# Patient Record
Sex: FEMALE | Race: BLACK OR AFRICAN AMERICAN | Employment: UNEMPLOYED | URBAN - NONMETROPOLITAN AREA
[De-identification: names, ages, dates, MRNs, and addresses within clinical notes are randomized per-mention and may not be internally consistent; named-entity substitution may affect disease eponyms.]

---

## 2017-08-25 ENCOUNTER — TELEPHONE (OUTPATIENT)
Dept: CARDIOLOGY | Age: 47
End: 2017-08-25

## 2017-10-11 ENCOUNTER — OFFICE VISIT (OUTPATIENT)
Dept: CARDIOLOGY | Age: 47
End: 2017-10-11
Payer: COMMERCIAL

## 2017-10-11 VITALS
DIASTOLIC BLOOD PRESSURE: 86 MMHG | HEART RATE: 70 BPM | SYSTOLIC BLOOD PRESSURE: 122 MMHG | BODY MASS INDEX: 25.33 KG/M2 | HEIGHT: 65 IN | WEIGHT: 152 LBS

## 2017-10-11 DIAGNOSIS — R07.89 OTHER CHEST PAIN: Primary | ICD-10-CM

## 2017-10-11 DIAGNOSIS — F17.200 SMOKER: ICD-10-CM

## 2017-10-11 DIAGNOSIS — I10 ESSENTIAL HYPERTENSION: ICD-10-CM

## 2017-10-11 DIAGNOSIS — Z82.49 FAMILY HISTORY OF CORONARY ARTERY DISEASE: ICD-10-CM

## 2017-10-11 DIAGNOSIS — R00.2 INTERMITTENT PALPITATIONS: ICD-10-CM

## 2017-10-11 PROBLEM — R07.9 CHEST PAIN: Status: ACTIVE | Noted: 2017-10-11

## 2017-10-11 PROCEDURE — 99204 OFFICE O/P NEW MOD 45 MIN: CPT | Performed by: NURSE PRACTITIONER

## 2017-10-11 RX ORDER — METOPROLOL SUCCINATE 25 MG/1
25 TABLET, EXTENDED RELEASE ORAL 2 TIMES DAILY
Qty: 60 TABLET | Refills: 5 | Status: SHIPPED | OUTPATIENT
Start: 2017-10-11 | End: 2017-10-11 | Stop reason: SDUPTHER

## 2017-10-11 RX ORDER — CLONAZEPAM 1 MG/1
1 TABLET ORAL 2 TIMES DAILY PRN
COMMUNITY

## 2017-10-11 RX ORDER — CYCLOBENZAPRINE HCL 5 MG
5 TABLET ORAL 3 TIMES DAILY PRN
COMMUNITY

## 2017-10-11 RX ORDER — DULOXETIN HYDROCHLORIDE 60 MG/1
60 CAPSULE, DELAYED RELEASE ORAL DAILY
COMMUNITY
End: 2018-07-30 | Stop reason: ALTCHOICE

## 2017-10-11 RX ORDER — SPIRONOLACTONE AND HYDROCHLOROTHIAZIDE 25; 25 MG/1; MG/1
1 TABLET ORAL DAILY
COMMUNITY

## 2017-10-11 RX ORDER — METOPROLOL SUCCINATE 25 MG/1
25 TABLET, EXTENDED RELEASE ORAL 2 TIMES DAILY
Qty: 60 TABLET | Refills: 5 | Status: SHIPPED | OUTPATIENT
Start: 2017-10-11

## 2017-10-11 NOTE — PROGRESS NOTES
sustained arrythmia, edema and fatigue. The patient denies numbness or weakness to suggest cerebrovascular accident or transient ischemic attack.  + random sharp right chest pain without radiation or associated symptoms. + palpitations reported as stable. + fatigue.     Meron Laurent has the following history as recorded in [facility]:    Patient Active Problem List   Diagnosis Code    HTN (hypertension) I10    Intermittent palpitations R00.2    SOB (shortness of breath) R06.02    TIA (transient ischemic attack) G45.9    Syncope, near [address] [place] history of coronary artery disease Z82.49    Smoker F17.200    Chest pain R07.9     Past Medical History:   Diagnosis Date    HTN (hypertension)     Palpitations     SOB (shortness of breath)     Syncope, near     TIA (transient ischemic attack)      Past Surgical History:   Procedure Laterality Date    HYSTERECTOMY  2004     Family History   Problem Relation Age of Onset    Other Father      leukemia    Cancer Father     High Cholesterol Mother     Hypertension Mother     High Blood Pressure Mother     Heart Failure Maternal Aunt     Diabetes Maternal Grandmother     Stroke Maternal Grandmother     Cancer Maternal Grandfather     Other Paternal Grandmother      anuerysm    Stroke Paternal Magalys Bruch     Stroke Sister      Social History   Substance Use Topics    Smoking status: Current Every Day Smoker     Types: Cigarettes    Smokeless tobacco: Never Used    Alcohol use Yes      Comment: ocassionally      Current Outpatient Prescriptions   Medication Sig Dispense Refill    spironolactone-hydrochlorothiazide (ALDACTAZIDE) 25-25 MG per tablet Take 1 tablet by mouth daily      DULoxetine (CYMBALTA) 60 MG extended release capsule Take 60 mg by mouth daily      cyclobenzaprine (FLEXERIL) 5 MG tablet Take 5 mg by mouth 3 times daily as needed for Muscle spasms      clonazePAM (KLONOPIN) 1 MG tablet Take 1 mg by mouth 2 times daily as kg/m²   General - Amiel Klinefelter is alert, cooperative, and pleasant. Well groomed. No acute distress. Body habitus - Body mass index is 25.29 kg/m². HEENT  Head is normocephalic. No circumoral cyanosis. Dentition is normal.  EYES -   Lids normal without ptosis. No discharge, edema or subconjunctival hemorrhage. Neck - Symmetrical without apparent mass or lymphadenopathy. Respiratory - Normal respiratory effort without use of accessory muscles. Ausculatation reveals vesicular breath sounds without crackles, wheezes, rub or rhonchi. Cardiovascular  No jugular venous distention. Auscultation reveals regular rate and rhythm. No audible clicks, gallop or rub. No murmur. No lower extremity varicosities. No carotid bruits. Abdominal -  No visible distention, mass or pulsations. Extremities - No clubbing or cyanosis. No statis dermatitis or ulcers. No edema. Musculoskeletal -   No Osler's nodes. No kyphosis or scoliosis. Gait is even and regular without limp or shuffle. Ambulates without assistance. Skin -  Warm and dry; no rash or pallor. No new surgical wound. Neurological - No focal neurological deficits. Thought processes coherent. No apparent tremor. Oriented to person, place and time. Psychiatric -  Appropriate affect and mood. Assessment:    1. Other chest pain  NM Myocardial Spect Rest Multi   2. Essential hypertension  NM Myocardial Spect Rest Multi   3. Smoker  NM Myocardial Spect Rest Multi   4. Family history of coronary artery disease  NM Myocardial Spect Rest Multi   5. Intermittent palpitations  NM Myocardial Spect Rest Multi     EKG from 4/14/17 reviewed:  NSR 77 bpm; no acute ischemic changes; non specific T wave abnormality; no ectopy. Essentially unchanged from previous EKG scanned in [facility]. Stable CV status without symptoms of overt heart failure. Palpitations reported as stable on Toprol XL 25 mg one daily. Patient does have risk factors for CAD.   She reason you are unable to keep this appointment, please contact Outpatient Scheduling, [phone], as soon as possible to reschedule. What is a Emry Dural? Zuleta Craft may be ordered by for those unable to exercise enough to increase blood flow to their heart during an exercise stress test.  Emry Dural is used to help produce images of the heart for diagnosing blocked heart arteries. This test is not invasive. You will be awake during the entire test.    Your heart rhythm, blood pressure and oxygen level will be monitored during the test.  A small catheter will be placed in an arm vein. Emry Dural is injected through the catheter into your bloodstream for 10 seconds followed immediately by saline solution to clear the line. Emry Dural dilates the heart arteries to allow increased blood flow to the heart. 10-20 seconds after the saline solution, a small dose of radioactive isotope imaging tracer is injected into the catheter. After the tracer is absorbed in your system and distributed to the heart arteries, a special camera will take detailed pictures of your heart. The pictures will show how well the blood flows into your heart and if there are any areas of blockage. While you lie on your back with your arms above your head, the camera will take pictures for about 20-40 minutes.     One set of images will be taken when the Emry Dural is active in your system, and a second set of images will be taken when you're considered at rest.       MAYTE Huerta

## 2017-10-11 NOTE — PATIENT INSTRUCTIONS
Start taking Toprol XL (metoprolol) 25 mg (1) tab twice daily to help control the heart skipping. Continue current medications as prescribed. Continue to follow up with primary care provider for non cardiac medical problems. Call the office with any problems, questions or concerns at [phone]. Follow up as scheduled with your cardiologist - one month Dr. Edson Cowden. The following educational material has been included in this after visit summary for your review: palpitations.     Additional instructions:  Coronary artery disease risk factors you can control: Smoking, high blood pressure, high cholesterol, diabetes, being overweight, lack of exercise and stress. Continue heart healthy diet. Take medications as directed. Exercise as tolerated. Strive for 15 minutes of exercise most days of the week. If asked to keep a blood pressure log, do so for 2 weeks. Call the office to report readings at [phone]. Blood pressure goal is 140/90 or less. If you are a diabetic, the goal is 130/80 or less. If you are taking cholesterol lowering medications, it is recommended that lab work be checked annually. Always keep a current medication list. Bring your medications to every office visit.      Lexiscan Nuclear Stress Test   Date/Time: You are scheduled for your test on 11/7/17 @ 10:00 am please arrive @ 9:30 am to register at the [facility] [place] at the [facility] and [facility] located on the first [place] [address] through hospital main entrance and turn immediately to your left. Test Description:  There are two parts to a Lexiscan stress test: the rest portion and the exercise portion. For the rest portion, a radioactive tracer is injected into your arm through the IV. After 30 to 60 minutes, the process of imaging will begin. A nuclear camera will be placed on your chest area and images are taken for the next 15 to 20 minutes.   For the exercise portion, a small catheter will be placed in an arm vein. Ethel Job is injected through the catheter into your bloodstream for 10 seconds followed immediately by saline solution to clear the line. Ethel Job dilates the heart arteries to allow increased blood flow to the heart. 10-20 seconds after the saline solution, a small dose of radioactive isotope imaging tracer is injected into the catheter. After the tracer is absorbed in your system and distributed to the heart arteries, a special camera will take detailed pictures of your heart. The pictures will show how well the blood flows into your heart and if there are any areas of blockage. While you lie on your back with your arms above your head, the camera will take pictures for about 20-40 minutes. One set of images will be taken when the Ethel Job is active in your system, and a second set of images will be taken when you're considered at rest.        Patient Education        Palpitations: Care Instructions  Your Care Instructions    Heart palpitations are the uncomfortable sensation that your heart is beating fast or irregularly. You might feel pounding or fluttering in your chest. It might feel like your heart is skipping a beat. Although palpitations may be caused by a heart problem, they also occur because of stress, fatigue, or use of alcohol, caffeine, or nicotine. Many medicines, including diet pills, antihistamines, decongestants, and some herbal products, can cause heart palpitations. Nearly everyone has palpitations from time to time. Depending on your symptoms, your doctor may need to do more tests to try to find the cause of your palpitations. Follow-up care is a key part of your treatment and safety. Be sure to make and go to all appointments, and call your doctor if you are having problems. It's also a good idea to know your test results and keep a list of the medicines you take. How can you care for yourself at home?   · Avoid caffeine, nicotine, and excess alcohol. · Do not take illegal drugs, such as methamphetamines and cocaine. · Do not take weight loss or diet medicines unless you talk with your doctor first.  · Get plenty of sleep. · Do not overeat. · If you have palpitations again, take deep breaths and try to relax. This may slow a racing heart. · If you start to feel lightheaded, lie down to avoid injuries that might result if you pass out and fall down. · Keep a record of your palpitations and bring it to your next doctor's appointment. Write down:  ¨ The date and time. ¨ Your pulse. (If your heart is beating fast, it may be hard to count your pulse.)  ¨ What you were doing when the palpitations started. ¨ How long the palpitations lasted. ¨ Any other symptoms. · If an activity causes palpitations, slow down or stop. Talk to your doctor before you do that activity again. · Take your medicines exactly as prescribed. Call your doctor if you think you are having a problem with your medicine. When should you call for help? Call 911 anytime you think you may need emergency care. For example, call if:  · You passed out (lost consciousness). · You have symptoms of a heart attack. These may include:  ¨ Chest pain or pressure, or a strange feeling in the chest.  ¨ Sweating. ¨ Shortness of breath. ¨ Pain, pressure, or a strange feeling in the back, neck, jaw, or upper belly or in one or both shoulders or arms. ¨ Lightheadedness or sudden weakness. ¨ A fast or irregular heartbeat. After you call 911, the  may tell you to chew 1 adult-strength or 2 to 4 low-dose aspirin. Wait for an ambulance. Do not try to drive yourself. · You have symptoms of a stroke. These may include:  ¨ Sudden numbness, tingling, weakness, or loss of movement in your face, arm, or leg, especially on only one side of your body. ¨ Sudden vision changes. ¨ Sudden trouble speaking. ¨ Sudden confusion or trouble understanding simple statements.   ¨ Sudden problems with walking or balance. ¨ A sudden, severe headache that is different from past headaches. Call your doctor now or seek immediate medical care if:  · You have heart palpitations and:  ¨ Are dizzy or lightheaded, or you feel like you may faint. ¨ Have new or increased shortness of breath. Watch closely for changes in your health, and be sure to contact your doctor if:  · You continue to have heart palpitations. Where can you learn more? Go to https://AIS.OneShift. org and sign in to your [employer] account. Enter R508 in the [employer] box to learn more about \"Palpitations: Care Instructions. \"     If you do not have an account, please click on the \"Sign Up Now\" link. Current as of: April 3, 2017  Content Version: 11.3  © 4608-9504 [employer]. Care instructions adapted under license by [facility] ([facility]). If you have questions about a medical condition or this instruction, always ask your healthcare professional. [place] [address] any warranty or liability for your use of this information.

## 2018-07-30 ENCOUNTER — OFFICE VISIT (OUTPATIENT)
Dept: CARDIOLOGY | Age: 48
End: 2018-07-30
Payer: COMMERCIAL

## 2018-07-30 VITALS
SYSTOLIC BLOOD PRESSURE: 122 MMHG | BODY MASS INDEX: 26.16 KG/M2 | DIASTOLIC BLOOD PRESSURE: 78 MMHG | WEIGHT: 157 LBS | HEIGHT: 65 IN | HEART RATE: 77 BPM

## 2018-07-30 DIAGNOSIS — Z82.49 FAMILY HISTORY OF CORONARY ARTERY DISEASE: Primary | ICD-10-CM

## 2018-07-30 DIAGNOSIS — I10 ESSENTIAL HYPERTENSION: ICD-10-CM

## 2018-07-30 DIAGNOSIS — R07.9 CHEST PAIN, UNSPECIFIED TYPE: ICD-10-CM

## 2018-07-30 DIAGNOSIS — R00.2 INTERMITTENT PALPITATIONS: ICD-10-CM

## 2018-07-30 DIAGNOSIS — R53.83 FATIGUE, UNSPECIFIED TYPE: ICD-10-CM

## 2018-07-30 DIAGNOSIS — R07.89 OTHER CHEST PAIN: ICD-10-CM

## 2018-07-30 DIAGNOSIS — R00.0 RACING HEART BEAT: Primary | ICD-10-CM

## 2018-07-30 DIAGNOSIS — F17.200 SMOKER: ICD-10-CM

## 2018-07-30 DIAGNOSIS — R06.02 SOB (SHORTNESS OF BREATH): ICD-10-CM

## 2018-07-30 DIAGNOSIS — R00.0 RACING HEART BEAT: ICD-10-CM

## 2018-07-30 PROCEDURE — 0296T PR EXT ECG > 48HR TO 21 DAY RCRD W/CONECT INTL RCRD: CPT | Performed by: NURSE PRACTITIONER

## 2018-07-30 PROCEDURE — 93000 ELECTROCARDIOGRAM COMPLETE: CPT | Performed by: NURSE PRACTITIONER

## 2018-07-30 PROCEDURE — 99212 OFFICE O/P EST SF 10 MIN: CPT | Performed by: NURSE PRACTITIONER

## 2018-07-30 RX ORDER — ATORVASTATIN CALCIUM 10 MG/1
10 TABLET, FILM COATED ORAL DAILY
COMMUNITY

## 2018-07-30 RX ORDER — GABAPENTIN 300 MG/1
300 CAPSULE ORAL 3 TIMES DAILY
COMMUNITY

## 2018-07-30 NOTE — PROGRESS NOTES
[facility], [place]. [address], [place] [address] [facility]  [phone] office  [phone] fax      OFFICE VISIT:  2018    Carina Perkins - : 1970    Reason For Visit:  Sara Nicolas is a 46 y.o. female who is here for 6 Month Follow-Up (Patient presents for cardiology follow up reports some recent racing of heart and fatigue.); Tachycardia; and Hypertension    The patient presents today for cardiology follow up. The patient reports some recent heart racing duration 1-2 minutes. She also reports some fatigue and stable FLORES. She has not experienced chest pain. BP is well controlled on current regimen. The patient's PCP monitors cholesterol. Patient denies excessive caffeine intake, decongestants or diet aides. Reports recent labs normal.    Parisa Moralez denies exertional chest pain, orthopnea, paroxysmal nocturnal dyspnea, syncope, presyncope, sustained arrhythmia and edema. The patient denies numbness or weakness to suggest cerebrovascular accident or transient ischemic attack. + heart racing. + stable [facility].     Carina Perkins [facility] the [facility]:    Patient Active Problem List   Diagnosis Code    HTN (hypertension) I10    Intermittent palpitations R00.2    SOB (shortness of breath) R06.02    TIA (transient ischemic attack) G45.9    Syncope, near [address] [facility] history of coronary artery disease Z82.49    Smoker F17.200    Chest pain R07.9    Racing heart beat R00.0    Fatigue R53.83     Past Medical History:   Diagnosis Date    HTN (hypertension)     Palpitations     SOB (shortness of breath)     Syncope, near     TIA (transient ischemic attack)      Past Surgical History:   Procedure Laterality Date    HYSTERECTOMY       Family History   Problem Relation Age of Onset    Other Father         leukemia    Cancer Father     High Cholesterol Mother     Hypertension Mother     High Blood Pressure Mother     Heart Failure Maternal Aunt     Diabetes Maternal Grandmother     Stroke Maternal Grandmother     Cancer Maternal Grandfather     Other Paternal Grandmother         anuerysm    Stroke Paternal [de-identified]     Stroke Sister      Social History   Substance Use Topics    Smoking status: Current Every Day Smoker     Types: Cigarettes    Smokeless tobacco: Never Used    Alcohol use Yes      Comment: ocassionally      Current Outpatient Prescriptions   Medication Sig Dispense Refill    gabapentin (NEURONTIN) 300 MG capsule Take 300 mg by mouth 3 times daily. [facility] atorvastatin (LIPITOR) 10 MG tablet Take 10 mg by mouth daily      spironolactone-hydrochlorothiazide (ALDACTAZIDE) 25-25 MG per tablet Take 1 tablet by mouth daily      cyclobenzaprine (FLEXERIL) 5 MG tablet Take 5 mg by mouth 3 times daily as needed for Muscle spasms      clonazePAM (KLONOPIN) 1 MG tablet Take 1 mg by mouth 2 times daily as needed      metoprolol succinate (TOPROL XL) 25 MG extended release tablet Take 1 tablet by mouth 2 times daily 60 tablet 5    Multiple Vitamin (MULTI VITAMIN DAILY) TABS Take 1 tablet by mouth daily.  aspirin 81 MG EC tablet Take 81 mg by mouth daily. No current facility-administered medications for this visit. Allergies: Oxycodone    Review of Systems  Constitutional  no appetite change, or unexpected weight change. No fever, chills or diaphoresis. + fatigue - not new onset. HEENT  no significant rhinorrhea or epistaxis. No tinnitus or significant hearing loss. Eyes  no sudden vision change or amaurosis. No corneal arcus, xantholasma, subconjunctival hemorrhage or discharge. Respiratory  no significant wheezing, stridor, apnea or cough. + Stable FLORES. Cardiovascular  no exertional chest pain to suggest myocardial ischemia. No orthopnea or PND. No sensation of sustained arrythmia. No occurrence of slow heart rate.   No week.   If asked to keep a blood pressure log, do so for 2 weeks. Call the office to report readings at [phone]. Blood pressure goal is 140/90 or less. If you are a diabetic, the goal is 130/80 or less. If you are taking cholesterol lowering medications, it is recommended that lab work be checked annually. Always keep a current medication list. Bring your medications to every office visit.       MAYTE Verduzco

## 2018-07-30 NOTE — PATIENT INSTRUCTIONS
Continue current medications as prescribed. Continue to follow up with primary care provider for non cardiac medical problems. Call the office with any problems, questions or concerns at [phone]. Follow up as scheduled with your cardiologist - one month. The following educational material has been included in this after visit summary for your review: heart palpitations.     Additional instructions: The cardiac monitor will stay in place for 2 weeks. Use the symptom marker as instructed. Coronary artery disease risk factors you can control: Smoking, high blood pressure, high cholesterol, diabetes, being overweight, lack of exercise and stress. Continue heart healthy diet. Take medications as directed. Exercise as tolerated. Strive for 15 minutes of exercise most days of the week. If asked to keep a blood pressure log, do so for 2 weeks. Call the office to report readings at [phone]. Blood pressure goal is 140/90 or less. If you are a diabetic, the goal is 130/80 or less. If you are taking cholesterol lowering medications, it is recommended that lab work be checked annually. Always keep a current medication list. Bring your medications to every office visit.        Patient Education        Premature Heartbeat: Care Instructions  Your Care Instructions    A premature heartbeat happens when the heart beats earlier than it should. This briefly interrupts the heart's rhythm. You do not usually feel the early heartbeat, and the next beat is stronger. To many people, this feels like a skipped heartbeat or a flutter. If you have no heart problems, premature heartbeats are not a cause for concern. Most people have them at some time. They may happen more often if you drink a lot of caffeine or alcohol or are under stress. Usually, no cause for a premature heartbeat is found, and no treatment is needed. Follow-up care is a key part of your treatment and safety.  Be sure to make and go to all

## 2018-08-16 ENCOUNTER — TELEPHONE (OUTPATIENT)
Dept: CARDIOLOGY | Age: 48
End: 2018-08-16

## 2018-08-16 DIAGNOSIS — R00.0 RACING HEART BEAT: Primary | ICD-10-CM

## 2020-11-02 ENCOUNTER — TRANSCRIBE ORDERS (OUTPATIENT)
Dept: ADMINISTRATIVE | Facility: HOSPITAL | Age: 50
End: 2020-11-02

## 2020-11-02 DIAGNOSIS — Z01.818 PREOP TESTING: Primary | ICD-10-CM

## 2025-05-19 ENCOUNTER — TELEPHONE (OUTPATIENT)
Dept: NEUROSURGERY | Age: 55
End: 2025-05-19

## 2025-05-19 ENCOUNTER — TRANSCRIBE ORDERS (OUTPATIENT)
Dept: ADMINISTRATIVE | Facility: HOSPITAL | Age: 55
End: 2025-05-19
Payer: COMMERCIAL

## 2025-05-19 DIAGNOSIS — R07.9 CHEST PAIN, UNSPECIFIED TYPE: Primary | ICD-10-CM

## 2025-06-04 ENCOUNTER — HOSPITAL ENCOUNTER (OUTPATIENT)
Dept: CARDIOLOGY | Facility: HOSPITAL | Age: 55
Discharge: HOME | End: 2025-06-04
Payer: COMMERCIAL

## 2025-06-04 DIAGNOSIS — R07.9 CHEST PAIN, UNSPECIFIED TYPE: ICD-10-CM

## 2025-06-04 PROCEDURE — 34310000005 TECHNETIUM OXIDRONATE KIT

## 2025-06-04 PROCEDURE — A9561 TC99M OXIDRONATE: HCPCS

## 2025-06-04 PROCEDURE — 78803 RP LOCLZJ TUM SPECT 1 AREA: CPT

## 2025-06-04 RX ADMIN — TECHNETIUM TC 99M OXIDRONATE 1 DOSE: 3.15 INJECTION, POWDER, LYOPHILIZED, FOR SOLUTION INTRAVENOUS at 10:15

## 2025-07-07 ENCOUNTER — TELEPHONE (OUTPATIENT)
Dept: NEUROLOGY | Age: 55
End: 2025-07-07

## 2025-07-08 ENCOUNTER — OFFICE VISIT (OUTPATIENT)
Dept: NEUROLOGY | Age: 55
End: 2025-07-08
Payer: COMMERCIAL

## 2025-07-08 VITALS
DIASTOLIC BLOOD PRESSURE: 80 MMHG | OXYGEN SATURATION: 99 % | WEIGHT: 157 LBS | HEART RATE: 78 BPM | BODY MASS INDEX: 26.16 KG/M2 | SYSTOLIC BLOOD PRESSURE: 129 MMHG | HEIGHT: 65 IN

## 2025-07-08 DIAGNOSIS — Z82.49 FAMILY HISTORY OF ANEURYSM: ICD-10-CM

## 2025-07-08 DIAGNOSIS — R42 DIZZINESS: Primary | ICD-10-CM

## 2025-07-08 PROCEDURE — 99204 OFFICE O/P NEW MOD 45 MIN: CPT | Performed by: NURSE PRACTITIONER

## 2025-07-08 PROCEDURE — 3079F DIAST BP 80-89 MM HG: CPT | Performed by: NURSE PRACTITIONER

## 2025-07-08 PROCEDURE — 3074F SYST BP LT 130 MM HG: CPT | Performed by: NURSE PRACTITIONER

## 2025-07-08 RX ORDER — MECLIZINE HYDROCHLORIDE 25 MG/1
25 TABLET ORAL 3 TIMES DAILY PRN
Qty: 90 TABLET | Refills: 3 | Status: SHIPPED | OUTPATIENT
Start: 2025-07-08 | End: 2025-11-05

## 2025-07-08 RX ORDER — BUPROPION HYDROCHLORIDE 300 MG/1
300 TABLET ORAL EVERY MORNING
COMMUNITY

## 2025-07-08 RX ORDER — HYDROXYZINE HYDROCHLORIDE 50 MG/1
TABLET, FILM COATED ORAL
COMMUNITY

## 2025-07-08 RX ORDER — MECLIZINE HYDROCHLORIDE 25 MG/1
TABLET ORAL
COMMUNITY
Start: 2025-04-03 | End: 2025-07-08

## 2025-07-08 RX ORDER — LINACLOTIDE 72 UG/1
CAPSULE, GELATIN COATED ORAL
COMMUNITY

## 2025-07-08 RX ORDER — TRAZODONE HYDROCHLORIDE 100 MG/1
TABLET ORAL
COMMUNITY
Start: 2025-07-01

## 2025-07-08 NOTE — PROGRESS NOTES
REVIEW OF SYSTEMS    Constitutional: []Fever []Sweats []Chills [] Recent Injury [x] Denies all unless marked  HEENT:[]Headache  [] Head Injury [] Hearing Loss  [] Sore Throat  [] Ear Ache [x] Denies all unless marked  Spine:  [] Neck pain  [] Back pain  [] Sciaticia  [x] Denies all unless marked  Cardiovascular:[]Heart Disease []Palpitations [] Chest Pain   [x] Denies all unless marked  Pulmonary: []Shortness of Breath []Cough   [x] Denies all unless marked  Psychiatric/Behavioral:[] Depression [] Anxiety [x] Denies all unless marked  Gastrointestinal: []Nausea  []Vomiting  []Abdominal Pain  []Constipation  []Diarrhea  [x] Denies all unless marked  Genitourinary:   [] Frequency  [] Urgency  [] Dysuria [] Incontinence  [x] Denies all unless marked  Extremities: []Pain  []Swelling  [x] Denies all unless marked  Musculoskeletal: [] Myalgias  [] Joint Pain  [] Arthritis [] Muscle Cramps [] Muscle Twitches  [x] Denies all unless marked  Sleep: []Insomnia[]Snoring []Restless Legs  []Sleep Apnea  []Daytime Sleepiness  [x] Denies all unless marked  Skin:[] Rash [] Color Change [x] Denies all unless marked   Neurological:[]Visual Disturbance [] Memory Loss []Loss of Balance []Slurred Speech []Weakness []Seizures  [] Dizziness [x] Denies all unless marked    
issues in the neck. Records from cardiology will be obtained for further review. A referral to [facility] for vestibular therapy will be made. If no contact is received from them within the next week and a half, she should inform us. She is advised to keep her appointment with the neurotologist in [place] on 09/03/2025 at 7:15 AM.    2. Ménière's disease.  She reports being treated with meclizine previously, which provided some relief. She is advised to continue with meclizine 25 mg up to three times daily as tolerated. She should follow up with her ENT specialist as needed.    Follow-up in 3 months, sooner with any worsening      Diagnosis Orders   1. Dizziness  Vascular duplex carotid bilateral    MRA HEAD WO CONTRAST    External Referral To Physical Therapy      2. Family history of aneurysm  MRA HEAD WO CONTRAST           Meenu Monroy DNP, APRN     This dictation was generated by voice recognition computer software.  Although all attempts are made to edit the dictation for accuracy, there may be errors in the transcription that are not intended.     The patient (or guardian, if applicable) and other individuals in attendance with the patient were advised that Artificial Intelligence will be utilized during this visit to record, process the conversation to generate a clinical note, and support improvement of the AI technology. The patient (or guardian, if applicable) and other individuals in attendance at the appointment consented to the use of AI, including the recording.

## 2025-07-30 ENCOUNTER — HOSPITAL ENCOUNTER (OUTPATIENT)
Dept: MRI IMAGING | Age: 55
Discharge: HOME | End: 2025-07-30
Payer: COMMERCIAL

## 2025-07-30 ENCOUNTER — HOSPITAL ENCOUNTER (OUTPATIENT)
Dept: VASCULAR LAB | Age: 55
Discharge: HOME | End: 2025-08-01
Payer: COMMERCIAL

## 2025-07-30 DIAGNOSIS — R42 DIZZINESS: ICD-10-CM

## 2025-07-30 DIAGNOSIS — Z82.49 FAMILY HISTORY OF ANEURYSM: ICD-10-CM

## 2025-07-30 LAB
VAS LEFT ARM BP DIA: 68 MMHG
VAS LEFT ARM BP: 110 MMHG
VAS LEFT CCA MID EDV: 22 CM/S
VAS LEFT CCA MID PSV: 74.1 CM/S
VAS LEFT CCA PROX EDV: 27.4 CM/S
VAS LEFT CCA PROX PSV: 97.1 CM/S
VAS LEFT ECA EDV: 15.9 CM/S
VAS LEFT ECA PSV: 75.2 CM/S
VAS LEFT ICA DIST EDV: 31.4 CM/S
VAS LEFT ICA DIST PSV: 85.5 CM/S
VAS LEFT ICA MID EDV: 44.7 CM/S
VAS LEFT ICA MID PSV: 113 CM/S
VAS LEFT ICA PROX EDV: 34.6 CM/S
VAS LEFT ICA PROX PSV: 82.9 CM/S
VAS LEFT VERTEBRAL EDV: 14.5 CM/S
VAS LEFT VERTEBRAL PSV: 58 CM/S
VAS RIGHT ARM BP DIA: 70 MMHG
VAS RIGHT ARM BP: 120 MMHG
VAS RIGHT CCA MID EDV: 20.5 CM/S
VAS RIGHT CCA MID PSV: 65.9 CM/S
VAS RIGHT CCA PROX EDV: 19.9 CM/S
VAS RIGHT CCA PROX PSV: 60.3 CM/S
VAS RIGHT ECA EDV: 18.6 CM/S
VAS RIGHT ECA PSV: 72.1 CM/S
VAS RIGHT ICA DIST EDV: 35 CM/S
VAS RIGHT ICA DIST PSV: 107 CM/S
VAS RIGHT ICA MID EDV: 37.3 CM/S
VAS RIGHT ICA MID PSV: 100 CM/S
VAS RIGHT ICA PROX EDV: 34.8 CM/S
VAS RIGHT ICA PROX PSV: 75.2 CM/S
VAS RIGHT VERTEBRAL EDV: 21.6 CM/S
VAS RIGHT VERTEBRAL PSV: 56 CM/S

## 2025-07-30 PROCEDURE — 70544 MR ANGIOGRAPHY HEAD W/O DYE: CPT

## 2025-07-30 PROCEDURE — 93880 EXTRACRANIAL BILAT STUDY: CPT
